# Patient Record
Sex: MALE | Race: WHITE | NOT HISPANIC OR LATINO | Employment: OTHER | URBAN - METROPOLITAN AREA
[De-identification: names, ages, dates, MRNs, and addresses within clinical notes are randomized per-mention and may not be internally consistent; named-entity substitution may affect disease eponyms.]

---

## 2023-05-31 ENCOUNTER — HOSPITAL ENCOUNTER (EMERGENCY)
Facility: HOSPITAL | Age: 83
Discharge: HOME | End: 2023-05-31
Attending: EMERGENCY MEDICINE
Payer: MEDICARE

## 2023-05-31 VITALS
SYSTOLIC BLOOD PRESSURE: 130 MMHG | RESPIRATION RATE: 16 BRPM | OXYGEN SATURATION: 96 % | HEIGHT: 71 IN | DIASTOLIC BLOOD PRESSURE: 73 MMHG | HEART RATE: 85 BPM | BODY MASS INDEX: 21.64 KG/M2 | TEMPERATURE: 98.4 F | WEIGHT: 154.54 LBS

## 2023-05-31 DIAGNOSIS — R33.9 URINARY RETENTION: Primary | ICD-10-CM

## 2023-05-31 LAB
ALBUMIN SERPL-MCNC: 4.8 G/DL (ref 3.5–5.2)
ALBUMIN/GLOB SERPL: 1.7 G/DL
ALP SERPL-CCNC: 52 U/L (ref 39–117)
ALT SERPL W P-5'-P-CCNC: 36 U/L (ref 1–41)
ANION GAP SERPL CALCULATED.3IONS-SCNC: 10.9 MMOL/L (ref 5–15)
AST SERPL-CCNC: 23 U/L (ref 1–40)
BACTERIA UR QL AUTO: ABNORMAL /HPF
BASOPHILS # BLD AUTO: 0.03 10*3/MM3 (ref 0–0.2)
BASOPHILS NFR BLD AUTO: 0.4 % (ref 0–1.5)
BILIRUB SERPL-MCNC: 1.1 MG/DL (ref 0–1.2)
BILIRUB UR QL STRIP: ABNORMAL
BUN SERPL-MCNC: 22 MG/DL (ref 8–23)
BUN/CREAT SERPL: 18 (ref 7–25)
CALCIUM SPEC-SCNC: 10 MG/DL (ref 8.6–10.5)
CHLORIDE SERPL-SCNC: 98 MMOL/L (ref 98–107)
CLARITY UR: CLEAR
CO2 SERPL-SCNC: 27.1 MMOL/L (ref 22–29)
COLOR UR: ABNORMAL
CREAT SERPL-MCNC: 1.22 MG/DL (ref 0.76–1.27)
D-LACTATE SERPL-SCNC: 2 MMOL/L (ref 0.5–2)
DEPRECATED RDW RBC AUTO: 42 FL (ref 37–54)
EGFRCR SERPLBLD CKD-EPI 2021: 58.8 ML/MIN/1.73
EOSINOPHIL # BLD AUTO: 0.01 10*3/MM3 (ref 0–0.4)
EOSINOPHIL NFR BLD AUTO: 0.1 % (ref 0.3–6.2)
ERYTHROCYTE [DISTWIDTH] IN BLOOD BY AUTOMATED COUNT: 12.1 % (ref 12.3–15.4)
GLOBULIN UR ELPH-MCNC: 2.8 GM/DL
GLUCOSE SERPL-MCNC: 138 MG/DL (ref 65–99)
GLUCOSE UR STRIP-MCNC: ABNORMAL MG/DL
HCT VFR BLD AUTO: 49 % (ref 37.5–51)
HGB BLD-MCNC: 16.7 G/DL (ref 13–17.7)
HGB UR QL STRIP.AUTO: ABNORMAL
HOLD SPECIMEN: NORMAL
HOLD SPECIMEN: NORMAL
HYALINE CASTS UR QL AUTO: ABNORMAL /LPF
IMM GRANULOCYTES # BLD AUTO: 0.01 10*3/MM3 (ref 0–0.05)
IMM GRANULOCYTES NFR BLD AUTO: 0.1 % (ref 0–0.5)
KETONES UR QL STRIP: ABNORMAL
LEUKOCYTE ESTERASE UR QL STRIP.AUTO: ABNORMAL
LIPASE SERPL-CCNC: 41 U/L (ref 13–60)
LYMPHOCYTES # BLD AUTO: 1.06 10*3/MM3 (ref 0.7–3.1)
LYMPHOCYTES NFR BLD AUTO: 13.5 % (ref 19.6–45.3)
MCH RBC QN AUTO: 32.3 PG (ref 26.6–33)
MCHC RBC AUTO-ENTMCNC: 34.1 G/DL (ref 31.5–35.7)
MCV RBC AUTO: 94.8 FL (ref 79–97)
MONOCYTES # BLD AUTO: 0.41 10*3/MM3 (ref 0.1–0.9)
MONOCYTES NFR BLD AUTO: 5.2 % (ref 5–12)
NEUTROPHILS NFR BLD AUTO: 6.31 10*3/MM3 (ref 1.7–7)
NEUTROPHILS NFR BLD AUTO: 80.7 % (ref 42.7–76)
NITRITE UR QL STRIP: ABNORMAL
NRBC BLD AUTO-RTO: 0 /100 WBC (ref 0–0.2)
PH UR STRIP.AUTO: ABNORMAL [PH]
PLATELET # BLD AUTO: 220 10*3/MM3 (ref 140–450)
PMV BLD AUTO: 9.5 FL (ref 6–12)
POTASSIUM SERPL-SCNC: 4.6 MMOL/L (ref 3.5–5.2)
PROT SERPL-MCNC: 7.6 G/DL (ref 6–8.5)
PROT UR QL STRIP: ABNORMAL
RBC # BLD AUTO: 5.17 10*6/MM3 (ref 4.14–5.8)
RBC # UR STRIP: ABNORMAL /HPF
REF LAB TEST METHOD: ABNORMAL
SODIUM SERPL-SCNC: 136 MMOL/L (ref 136–145)
SP GR UR STRIP: 1.01 (ref 1–1.03)
SQUAMOUS #/AREA URNS HPF: ABNORMAL /HPF
UROBILINOGEN UR QL STRIP: ABNORMAL
WBC # UR STRIP: ABNORMAL /HPF
WBC NRBC COR # BLD: 7.83 10*3/MM3 (ref 3.4–10.8)
WHOLE BLOOD HOLD COAG: NORMAL
WHOLE BLOOD HOLD SPECIMEN: NORMAL

## 2023-05-31 PROCEDURE — 99283 EMERGENCY DEPT VISIT LOW MDM: CPT

## 2023-05-31 PROCEDURE — 36415 COLL VENOUS BLD VENIPUNCTURE: CPT

## 2023-05-31 PROCEDURE — 83690 ASSAY OF LIPASE: CPT

## 2023-05-31 PROCEDURE — 85025 COMPLETE CBC W/AUTO DIFF WBC: CPT

## 2023-05-31 PROCEDURE — 80053 COMPREHEN METABOLIC PANEL: CPT

## 2023-05-31 PROCEDURE — 83605 ASSAY OF LACTIC ACID: CPT

## 2023-05-31 PROCEDURE — 81001 URINALYSIS AUTO W/SCOPE: CPT | Performed by: EMERGENCY MEDICINE

## 2023-05-31 RX ORDER — SODIUM CHLORIDE 0.9 % (FLUSH) 0.9 %
10 SYRINGE (ML) INJECTION AS NEEDED
Status: DISCONTINUED | OUTPATIENT
Start: 2023-05-31 | End: 2023-05-31 | Stop reason: HOSPADM

## 2023-05-31 RX ORDER — NITROFURANTOIN 25; 75 MG/1; MG/1
100 CAPSULE ORAL 2 TIMES DAILY
Qty: 14 CAPSULE | Refills: 0 | Status: SHIPPED | OUTPATIENT
Start: 2023-05-31 | End: 2023-06-09

## 2023-05-31 RX ORDER — ROSUVASTATIN CALCIUM 20 MG/1
20 TABLET, COATED ORAL DAILY
COMMUNITY

## 2023-05-31 RX ORDER — CLOPIDOGREL BISULFATE 75 MG/1
TABLET ORAL DAILY
COMMUNITY

## 2023-05-31 RX ORDER — LEVOTHYROXINE SODIUM 0.1 MG/1
100 TABLET ORAL DAILY
COMMUNITY

## 2023-05-31 RX ORDER — PANTOPRAZOLE SODIUM 40 MG/1
40 TABLET, DELAYED RELEASE ORAL DAILY
COMMUNITY

## 2023-05-31 NOTE — ED PROVIDER NOTES
Time: 8:48 AM EDT  Date of encounter:  5/31/2023  Independent Historian/Clinical History and Information was obtained by:   Patient  Chief Complaint: Urinary retention    History is limited by: N/A    History of Present Illness:  Patient is a 82 y.o. year old male who presents to the emergency department for evaluation of urinary retention.  Patient has not been sick in any way in the last 2 days and specifically denies dysuria, fever, abdominal pain.  He last urinated last night normally but overnight has since been unable to produce urine.  He comes to the ER today with suprapubic tenderness/fullness and a sense that his bladder was distended.  He denies any nausea vomiting diarrhea or constipation.  Since having the catheter placed on his arrival his symptoms have resolved and he feels normal on my physical exam.    HPI    Patient Care Team  Primary Care Provider: Juan Pablo Mar MD    Past Medical History:     No Known Allergies  Past Medical History:   Diagnosis Date   • Disease of thyroid gland    • Enlarged prostate    • Hyperlipidemia    • Kidney stone    • Stroke      Past Surgical History:   Procedure Laterality Date   • HERNIA REPAIR     • WRIST SURGERY       History reviewed. No pertinent family history.    Home Medications:  Prior to Admission medications    Medication Sig Start Date End Date Taking? Authorizing Provider   clopidogrel (PLAVIX) 75 MG tablet Take  by mouth Daily.    Berta Chew MD   levothyroxine (SYNTHROID, LEVOTHROID) 100 MCG tablet Take 1 tablet by mouth Daily.    Berta Chew MD   pantoprazole (PROTONIX) 40 MG EC tablet Take 1 tablet by mouth Daily.    Berta Chew MD   rosuvastatin (CRESTOR) 20 MG tablet Take 1 tablet by mouth Daily.    Berta Chew MD        Social History:   Social History     Tobacco Use   • Smoking status: Never   • Smokeless tobacco: Never   Vaping Use   • Vaping Use: Never used   Substance Use Topics   • Alcohol use: Yes   •  "Drug use: Never         Review of Systems:  Review of Systems   Constitutional: Negative for chills and fever.   HENT: Negative for congestion, rhinorrhea and sore throat.    Eyes: Negative for pain and visual disturbance.   Respiratory: Negative for apnea, cough, chest tightness and shortness of breath.    Cardiovascular: Negative for chest pain and palpitations.   Gastrointestinal: Negative for abdominal pain, diarrhea, nausea and vomiting.   Genitourinary: Positive for decreased urine volume and difficulty urinating. Negative for dysuria, flank pain, frequency, hematuria and testicular pain.   Musculoskeletal: Negative for joint swelling and myalgias.   Skin: Negative for color change.   Neurological: Negative for seizures and headaches.   Psychiatric/Behavioral: Negative.    All other systems reviewed and are negative.       Physical Exam:  /73   Pulse 85   Temp 98.4 °F (36.9 °C) (Oral)   Resp 16   Ht 180.3 cm (71\")   Wt 70.1 kg (154 lb 8.7 oz)   SpO2 96%   BMI 21.55 kg/m²     Physical Exam  Vitals and nursing note reviewed.   Constitutional:       Appearance: Normal appearance.   HENT:      Head: Normocephalic and atraumatic.      Nose: Nose normal.      Mouth/Throat:      Mouth: Mucous membranes are dry.   Eyes:      Extraocular Movements: Extraocular movements intact.      Pupils: Pupils are equal, round, and reactive to light.   Cardiovascular:      Rate and Rhythm: Normal rate and regular rhythm.      Heart sounds: Normal heart sounds.   Pulmonary:      Effort: Pulmonary effort is normal.      Breath sounds: Normal breath sounds.   Abdominal:      General: Bowel sounds are normal.      Palpations: Abdomen is soft.      Tenderness: There is no abdominal tenderness.   Musculoskeletal:         General: No swelling. Normal range of motion.      Cervical back: Normal range of motion and neck supple.   Skin:     General: Skin is warm and dry.      Coloration: Skin is not jaundiced.   Neurological:    "   General: No focal deficit present.      Mental Status: He is alert and oriented to person, place, and time. Mental status is at baseline.   Psychiatric:         Mood and Affect: Mood normal.         Behavior: Behavior normal.         Judgment: Judgment normal.                  Procedures:  Procedures      Medical Decision Making:      Comorbidities that affect care:    BPH, CVA, hyperlipidemia, thyroid disease    External Notes reviewed:    Encounter review: 12/12/2022 visit with internal medicine Dr. Mancilla. Description: Elevated PSA levels, BPH, GERD      The following orders were placed and all results were independently analyzed by me:  Orders Placed This Encounter   Procedures   • [place] Draw   • Comprehensive Metabolic Panel   • Lipase   • Lactic Acid, Plasma   • CBC Auto Differential   • Urinalysis With Culture If Indicated - Urine, Clean Catch   • Urinalysis, Microscopic Only - Urine, Clean Catch   • NPO Diet NPO Type: Strict NPO   • Undress & Gown   • Insert Peripheral IV   • CBC & Differential   • Green Top (Gel)   • Lavender Top   • Gold Top - SST   • Light Blue Top       Medications Given in the Emergency Department:  Medications   sodium chloride 0.9 % flush 10 mL (has no administration in time range)        ED Course:         Labs:    Lab Results (last 24 hours)     Procedure Component Value Units Date/Time    CBC & Differential [[id number]]  (Abnormal) Collected: 05/31/23 0633    Specimen: Blood Updated: 05/31/23 0642    Narrative:      The following orders were created for panel order CBC & Differential.  Procedure                               Abnormality         Status                     ---------                               -----------         ------                     CBC Auto Differential[[id number]]        Abnormal            Final result                 Please view results for these tests on the individual orders.    Comprehensive Metabolic Panel [[id number]]  (Abnormal) Collected:  05/31/23 0633    Specimen: Blood Updated: 05/31/23 0700     Glucose 138 mg/dL      BUN 22 mg/dL      Creatinine 1.22 mg/dL      Sodium 136 mmol/L      Potassium 4.6 mmol/L      Chloride 98 mmol/L      CO2 27.1 mmol/L      Calcium 10.0 mg/dL      Total Protein 7.6 g/dL      Albumin 4.8 g/dL      ALT (SGPT) 36 U/L      AST (SGOT) 23 U/L      Alkaline Phosphatase 52 U/L      Total Bilirubin 1.1 mg/dL      Globulin 2.8 gm/dL      A/G Ratio 1.7 g/dL      BUN/Creatinine Ratio 18.0     Anion Gap 10.9 mmol/L      eGFR 58.8 mL/min/1.73     Narrative:      GFR Normal >60  Chronic Kidney Disease <60  Kidney Failure <15    The GFR formula is only valid for adults with stable renal function between ages 18 and 70.    Lipase [[id number]]  (Normal) Collected: 05/31/23 0633    Specimen: Blood Updated: 05/31/23 0700     Lipase 41 U/L     Lactic Acid, Plasma [[id number]]  (Normal) Collected: 05/31/23 0633    Specimen: Blood Updated: 05/31/23 0658     Lactate 2.0 mmol/L     CBC Auto Differential [[id number]]  (Abnormal) Collected: 05/31/23 0633    Specimen: Blood Updated: 05/31/23 0642     WBC 7.83 10*3/mm3      RBC 5.17 10*6/mm3      Hemoglobin 16.7 g/dL      Hematocrit 49.0 %      MCV 94.8 fL      MCH 32.3 pg      MCHC 34.1 g/dL      RDW 12.1 %      RDW-SD 42.0 fl      MPV 9.5 fL      Platelets 220 10*3/mm3      Neutrophil % 80.7 %      Lymphocyte % 13.5 %      Monocyte % 5.2 %      Eosinophil % 0.1 %      Basophil % 0.4 %      Immature Grans % 0.1 %      Neutrophils, Absolute 6.31 10*3/mm3      Lymphocytes, Absolute 1.06 10*3/mm3      Monocytes, Absolute 0.41 10*3/mm3      Eosinophils, Absolute 0.01 10*3/mm3      Basophils, Absolute 0.03 10*3/mm3      Immature Grans, Absolute 0.01 10*3/mm3      nRBC 0.0 /100 WBC     Urinalysis With Culture If Indicated - Urine, Clean Catch [[id number]]  (Abnormal) Collected: 05/31/23 0832    Specimen: Urine, Clean Catch Updated: 05/31/23 0900     Color, UA Ritchie     Appearance, UA Clear     pH, UA --      Comment: Result not available due to interfering substances.        Specific Gravity, UA 1.010     Glucose, UA --     Comment: Result not available due to interfering substances.        Ketones, UA --     Comment: Result not available due to interfering substances.        Bilirubin, UA --     Comment: Result not available due to interfering substances.        Blood, UA --     Comment: Result not available due to interfering substances.        Protein, UA --     Comment: Result not available due to interfering substances.        Leuk Esterase, UA --     Comment: Result not available due to interfering substances.        Nitrite, UA --     Comment: Result not available due to interfering substances.        Urobilinogen, UA --     Comment: Result not available due to interfering substances.       Narrative:      In absence of clinical symptoms, the presence of pyuria, bacteria, and/or nitrites on the urinalysis result does not correlate with infection.    Urinalysis, Microscopic Only - Urine, Clean Catch [[id number]]  (Abnormal) Collected: 05/31/23 0832    Specimen: Urine, Clean Catch Updated: 05/31/23 0900     RBC, UA Too Numerous to Count /HPF      WBC, UA 0-2 /HPF      Comment: Urine culture not indicated.        Bacteria, UA None Seen /HPF      Squamous Epithelial Cells, UA 0-2 /HPF      Hyaline Casts, UA None Seen /LPF      Methodology Automated Microscopy           Imaging:    No Radiology Exams Resulted Within Past 24 Hours      Differential Diagnosis and Discussion:    Abdominal Pain: Based on the patient's signs and symptoms, I considered abdominal aortic aneurysm, small bowel obstruction, pancreatitis, acute cholecystitis, acute appendecitis, peptic ulcer disease, gastritis, colitis, endocrine disorders, irritable bowel syndrome and other differential diagnosis an etiology of the patient's abdominal pain.    All labs were reviewed and interpreted by me.    MDM         Patient Care Considerations:    CT  ABDOMEN AND PELVIS: I considered ordering a CT scan of the abdomen and pelvis however Patient has 0 abdominal pain on my exam.      Consultants/Shared Management Plan:    None    Social Determinants of Health:    Patient is independent, reliable, and has access to care.       Disposition and Care Coordination:    Discharged: The patient is suitable and stable for discharge with no need for consideration of observation or admission.    I have explained the patient´s condition, diagnoses and treatment plan based on the information available to me at this time. I have answered questions and addressed any concerns. The patient has a good  understanding of the patient´s diagnosis, condition, and treatment plan as can be expected at this point. The vital signs have been stable. The patient´s condition is stable and appropriate for discharge from the emergency department.      The patient will pursue further outpatient evaluation with the primary care physician or other designated or consulting physician as outlined in the discharge instructions. They are agreeable to this plan of care and follow-up instructions have been explained in detail. The patient has received these instructions in written format and have expressed an understanding of the discharge instructions. The patient is aware that any significant change in condition or worsening of symptoms should prompt an immediate return to this or the closest emergency department or call to 911.    Final diagnoses:   Urinary retention        ED Disposition     ED Disposition   Discharge    Condition   Stable    Comment   --             This medical record created using voice recognition software.           Raj Lovell MD  05/31/23 9805

## 2023-05-31 NOTE — DISCHARGE INSTRUCTIONS
Return to the ER for fever, persistent abdominal pain.  Call today to schedule an appointment with urology in the next week to have your catheter removed.  Stay well-hydrated.

## 2023-05-31 NOTE — ED TRIAGE NOTES
"Patient arrived to ED with complaints \"I can't pee.\"      States he has been up all night trying but unsuccessful.      \"I'm in a load of pain and feel like I am going to explode.\"     States the last time he urinated was last night around 2130.     Denies any fever, nausea or vomiting.   "

## 2023-06-08 NOTE — PROGRESS NOTES
Chief Complaint: Urinary Retention    Subjective         History of Present Illness  Shai Cole is a 82 y.o. male presents to [facility] to be seen for urinary retention.    Patient was seen in the ER on 5/31/2023 with urinary retention he did have a Valerio catheter placed which resolved his symptoms. He had over 700 ml in his bladder. He reports he had previously tried flomax but this caused dizziness. He reports he had hernia surgery in the past and was not able to void at that time he had a catheter for one day. He reports when the catheter was placed it was a difficult insertion but the relief was immediate.     Frequency- denies    Urgency- denies    Incontinence- denies    Nocturia- 2-3    Stream- hesitancy    GH- denies    Hx of kidney stones     surgeries- bladder stone retrieval    Family history of  malignancy- denies    Cardiopulmonary- denies    Anticoagulants- Plavix- CVA     Smoker- denies    Objective     Past Medical History:   Diagnosis Date    Disease of thyroid gland     Enlarged prostate     Hyperlipidemia     Kidney stone     Stroke        Past Surgical History:   Procedure Laterality Date    HERNIA REPAIR      WRIST SURGERY           Current Outpatient Medications:     levothyroxine (SYNTHROID, LEVOTHROID) 100 MCG tablet, Take 1 tablet by mouth Daily., Disp: , Rfl:     pantoprazole (PROTONIX) 40 MG EC tablet, Take 1 tablet by mouth Daily., Disp: , Rfl:     rosuvastatin (CRESTOR) 20 MG tablet, Take 1 tablet by mouth Daily., Disp: , Rfl:     clopidogrel (PLAVIX) 75 MG tablet, Take  by mouth Daily. (Patient not taking: Reported on 6/9/2023), Disp: , Rfl:     nitrofurantoin, macrocrystal-monohydrate, (MACROBID) 100 MG capsule, Take 1 capsule by mouth 2 (Two) Times a Day., Disp: 14 capsule, Rfl: 0    terazosin (HYTRIN) 1 MG capsule, Take 1 capsule by mouth Every Night., Disp: 30 capsule, Rfl: 3    No Known Allergies     No family history on file.    Social History  "    Socioeconomic History    Marital status:    Tobacco Use    Smoking status: Never     Passive exposure: Never    Smokeless tobacco: Never   Vaping Use    Vaping Use: Never used   Substance and Sexual Activity    Alcohol use: Yes    Drug use: Never    Sexual activity: Defer       Vital Signs:   Resp 16   Ht 180.3 cm (71\")   Wt 68.4 kg (150 lb 12.8 oz)   BMI 21.03 kg/m²      Physical Exam  Vitals reviewed.   Constitutional:       Appearance: Normal appearance.   Neurological:      General: No focal deficit present.      Mental Status: He is alert and oriented to person, place, and time.   Psychiatric:         Mood and Affect: Mood normal.         Behavior: Behavior normal.        Result Review :   The following data was reviewed by: YANI Maldonado on 06/09/2023:           Procedures        Assessment and Plan    Diagnoses and all orders for this visit:    1. Urinary retention (Primary)  -     Cancel: Bladder Scan  -     terazosin (HYTRIN) 1 MG capsule; Take 1 capsule by mouth Every Night.  Dispense: 30 capsule; Refill: 3    2. Benign prostatic hyperplasia with urinary retention  -     terazosin (HYTRIN) 1 MG capsule; Take 1 capsule by mouth Every Night.  Dispense: 30 capsule; Refill: 3    Discussed multiple options for treatment with his urinary retention.  He is not able to tolerate Flomax due to dizziness, so we will go with terazosin and we are starting at the lowest dose to hopefully prevent any dizziness that he may develop.  He will take this at bedtime.  We did discuss the possibility of doing a void trial today, but with it being Friday we were concerned that he would end up back in the emergency department over the weekend.  He will come back in on Tuesday at 8 AM for a void trial.  I did advise him that we would be closely monitoring for urinary retention so that he does not end up with an overstretched bladder.  He verbalized understanding.      Follow Up   No follow-ups on " file.  Patient was given instructions and counseling regarding his condition or for health maintenance advice. Please see specific information pulled into the AVS if appropriate.         This document has been electronically signed by YANI Maldonado  June 9, 2023 09:51 EDT

## 2023-06-09 ENCOUNTER — OFFICE VISIT (OUTPATIENT)
Dept: UROLOGY | Facility: CLINIC | Age: 83
End: 2023-06-09
Payer: MEDICARE

## 2023-06-09 VITALS — RESPIRATION RATE: 16 BRPM | BODY MASS INDEX: 21.11 KG/M2 | WEIGHT: 150.8 LBS | HEIGHT: 71 IN

## 2023-06-09 DIAGNOSIS — R33.8 BENIGN PROSTATIC HYPERPLASIA WITH URINARY RETENTION: ICD-10-CM

## 2023-06-09 DIAGNOSIS — R33.9 URINARY RETENTION: Primary | ICD-10-CM

## 2023-06-09 DIAGNOSIS — N40.1 BENIGN PROSTATIC HYPERPLASIA WITH URINARY RETENTION: ICD-10-CM

## 2023-06-09 RX ORDER — TERAZOSIN 1 MG/1
1 CAPSULE ORAL NIGHTLY
Qty: 30 CAPSULE | Refills: 3 | Status: SHIPPED | OUTPATIENT
Start: 2023-06-09

## 2023-06-13 ENCOUNTER — OFFICE VISIT (OUTPATIENT)
Dept: UROLOGY | Facility: CLINIC | Age: 83
End: 2023-06-13
Payer: MEDICARE

## 2023-06-13 ENCOUNTER — TELEPHONE (OUTPATIENT)
Dept: UROLOGY | Facility: CLINIC | Age: 83
End: 2023-06-13

## 2023-06-13 VITALS — RESPIRATION RATE: 16 BRPM | WEIGHT: 150.79 LBS | HEIGHT: 71 IN | BODY MASS INDEX: 21.11 KG/M2

## 2023-06-13 DIAGNOSIS — N40.1 BENIGN PROSTATIC HYPERPLASIA WITH URINARY RETENTION: ICD-10-CM

## 2023-06-13 DIAGNOSIS — R33.9 URINARY RETENTION: Primary | ICD-10-CM

## 2023-06-13 DIAGNOSIS — R33.8 BENIGN PROSTATIC HYPERPLASIA WITH URINARY RETENTION: ICD-10-CM

## 2023-06-13 NOTE — PROGRESS NOTES
"Chief Complaint  void trial    Subjective        Shai Cole presents to [facility] UROLOG[facility]  History of Present Illness    Objective   Vital Signs:  Resp 16   Ht 180.3 cm (71\")   Wt 68.4 kg (150 lb 12.7 oz)   BMI 21.03 kg/m²   Estimated body mass index is 21.03 kg/m² as calculated from the following:    Height as of this encounter: 180.3 cm (71\").    Weight as of this encounter: 68.4 kg (150 lb 12.7 oz).       BMI is within normal parameters. No other follow-up for BMI required.      Physical Exam   Result Review :            Irrigation of Bladder    Date/Time: 6/13/2023 8:15 AM  Performed by: Awilda Caceres RN  Authorized by: Fide Tucker APRN   Preparation: Patient was prepped and draped in the usual sterile fashion.  Local anesthesia used: no    Anesthesia:  Local anesthesia used: no    Sedation:  Patient sedated: no    Patient tolerance: patient tolerated the procedure well with no immediate complications  Comments: Patient in supine position.  Detached urinary drain bag from catheter.  Instilled 100Patient in supine position.  Detached urinary drain bag from catheter.  Instilled 100ml of sterile water into the bladder.  Deflated balloon and removed catheter.  Patient tolerated well.  Advised patient that if could not urinate within 4-6 hours or if pain becomes unbearable to come back into the office today to have a catheter inserted.  Patient voiced understanding.ml of sterile water into the bladder.  Deflated balloon and removed catheter.  Patient tolerated well.  Advised patient that if could not urinate within 4-6 hours or if pain becomes unbearable to come back into the office today to have a catheter inserted.  Patient voiced understanding.          Assessment and Plan   There are no diagnoses linked to this encounter.         Follow Up   No follow-ups on file.  Patient was given instructions and counseling regarding his condition or for health maintenance advice. Please see " specific information pulled into the AVS if appropriate.

## 2023-06-13 NOTE — TELEPHONE ENCOUNTER
Pt called back and said that he urinated a lot and had some bloody leakage along with blood in his urine while urinating.

## 2023-06-14 ENCOUNTER — CLINICAL SUPPORT (OUTPATIENT)
Dept: UROLOGY | Facility: CLINIC | Age: 83
End: 2023-06-14
Payer: MEDICARE

## 2023-06-14 VITALS — WEIGHT: 150 LBS | HEIGHT: 71 IN | BODY MASS INDEX: 21 KG/M2

## 2023-06-14 DIAGNOSIS — R33.9 URINARY RETENTION: Primary | ICD-10-CM

## 2023-06-14 LAB — URINE VOLUME: 41

## 2023-06-14 NOTE — PROGRESS NOTES
"Chief Complaint  Urinary Retention    Subjective        Shai Cole presents to [facility]  Urinary Retention      Objective   Vital Signs:  Ht 180.3 cm (70.98\")   Wt 68 kg (150 lb)   BMI 20.93 kg/m²   Estimated body mass index is 20.93 kg/m² as calculated from the following:    Height as of this encounter: 180.3 cm (70.98\").    Weight as of this encounter: 68 kg (150 lb).       BMI is within normal parameters. No other follow-up for BMI required.      Physical Exam   Result Review :                   Assessment and Plan   Diagnoses and all orders for this visit:    1. Urinary retention (Primary)  -     Bladder Scan      Bladder scanned patient and was found to have 41ml left in bladder after emptying his bladder right before. Patient states that he has been going well on his own. Per Antonette Tucker she wants patient to come back in a week to have his bladder scanned again. Encouraged patient to call us if any concerns or questions come up, and patient verbalized understanding.         Follow Up   No follow-ups on file.  Patient was given instructions and counseling regarding his condition or for health maintenance advice. Please see specific information pulled into the AVS if appropriate.       Answers submitted by the patient for this visit:  Nurse Visit on 6/14/2023  2:00 PM with NURSE  Other (Submitted on 6/13/2023)  Please describe your symptoms.: Follow up bladder scan.  Have you had these symptoms before?: No  How long have you been having these symptoms?: 5-7 days    "

## 2023-06-19 ENCOUNTER — OFFICE VISIT (OUTPATIENT)
Dept: INTERNAL MEDICINE | Facility: CLINIC | Age: 83
End: 2023-06-19
Payer: MEDICARE

## 2023-06-19 VITALS
HEIGHT: 71 IN | DIASTOLIC BLOOD PRESSURE: 76 MMHG | BODY MASS INDEX: 21.17 KG/M2 | HEART RATE: 75 BPM | TEMPERATURE: 97.1 F | OXYGEN SATURATION: 97 % | WEIGHT: 151.2 LBS | SYSTOLIC BLOOD PRESSURE: 138 MMHG

## 2023-06-19 DIAGNOSIS — E89.0 POSTABLATIVE HYPOTHYROIDISM: ICD-10-CM

## 2023-06-19 DIAGNOSIS — G45.9 TIA DUE TO EMBOLISM: ICD-10-CM

## 2023-06-19 DIAGNOSIS — R73.9 HYPERGLYCEMIA: ICD-10-CM

## 2023-06-19 DIAGNOSIS — N40.1 BENIGN PROSTATIC HYPERPLASIA WITH URINARY RETENTION: ICD-10-CM

## 2023-06-19 DIAGNOSIS — Z00.00 WELL ADULT EXAM: ICD-10-CM

## 2023-06-19 DIAGNOSIS — Z12.5 PROSTATE CANCER SCREENING: ICD-10-CM

## 2023-06-19 DIAGNOSIS — E53.8 VITAMIN B12 DEFICIENCY: ICD-10-CM

## 2023-06-19 DIAGNOSIS — E78.2 MIXED HYPERLIPIDEMIA: Primary | ICD-10-CM

## 2023-06-19 DIAGNOSIS — R33.8 BENIGN PROSTATIC HYPERPLASIA WITH URINARY RETENTION: ICD-10-CM

## 2023-06-19 DIAGNOSIS — M85.89 OSTEOPENIA OF MULTIPLE SITES: ICD-10-CM

## 2023-06-19 DIAGNOSIS — K21.9 GASTROESOPHAGEAL REFLUX DISEASE WITHOUT ESOPHAGITIS: ICD-10-CM

## 2023-06-19 DIAGNOSIS — I74.9 TIA DUE TO EMBOLISM: ICD-10-CM

## 2023-06-19 DIAGNOSIS — E55.9 VITAMIN D DEFICIENCY: ICD-10-CM

## 2023-06-19 PROBLEM — M46.1 INFLAMMATION OF SACROILIAC JOINT: Status: ACTIVE | Noted: 2020-12-01

## 2023-06-19 PROBLEM — E03.4 HYPOTHYROIDISM DUE TO ACQUIRED ATROPHY OF THYROID: Status: ACTIVE | Noted: 2023-06-19

## 2023-06-19 PROBLEM — D33.3 ACOUSTIC NEUROMA: Status: ACTIVE | Noted: 2019-12-02

## 2023-06-19 PROBLEM — I10 PRIMARY HYPERTENSION: Status: ACTIVE | Noted: 2023-06-19

## 2023-06-19 PROBLEM — J84.9 INTERSTITIAL LUNG DISEASE: Status: ACTIVE | Noted: 2019-12-03

## 2023-06-19 PROCEDURE — 1159F MED LIST DOCD IN RCRD: CPT | Performed by: INTERNAL MEDICINE

## 2023-06-19 PROCEDURE — 1160F RVW MEDS BY RX/DR IN RCRD: CPT | Performed by: INTERNAL MEDICINE

## 2023-06-19 PROCEDURE — 99204 OFFICE O/P NEW MOD 45 MIN: CPT | Performed by: INTERNAL MEDICINE

## 2023-06-19 RX ORDER — LANOLIN ALCOHOL/MO/W.PET/CERES
1000 CREAM (GRAM) TOPICAL WEEKLY
COMMUNITY

## 2023-06-19 RX ORDER — CHLORAL HYDRATE 500 MG
CAPSULE ORAL
COMMUNITY

## 2023-06-19 NOTE — ASSESSMENT & PLAN NOTE
This is an ongoing issue as of his 6/23 appointment.  He had problems with this in the past, was tried on tamsulosin, had some dizziness.  He was recently seen in the ER for significant urine retention, no UTI, no over-the-counter sinus meds etc.  He required a Valerio for over a week, has since seen urology and been started on very low-dose Hytrin.  He has follow-up scheduled with them later this week, appreciate their expertise.

## 2023-06-19 NOTE — ASSESSMENT & PLAN NOTE
Patient maintained on typical dose Plavix only as of 6/23, certainly recommend continue same along with aggressive LDL lowering.

## 2023-06-19 NOTE — PROGRESS NOTES
"Chief Complaint  Getting Established (Pt recently moved from [facility]. He usually goes to the doctor every 6 months. /Recent seen urology for non urination, he was put on Terazosin, he has a follow up on Wednesday. )    Subjective      Shai Cole presents to [facility]    History of Present Illness  Patient very pleasant 82-year-old male with underlying hyperlipidemia, hypothyroidism, prior TIA, currently being evaluated for BPH by urology, who is being seen 6/23 as a New Patient.  We will go over his med list, review recent labs, and make further recommendations at that time.    Review of Systems   Constitutional:  Negative for appetite change, fatigue and fever.   HENT:  Negative for congestion and ear pain.    Eyes:  Negative for blurred vision.   Respiratory:  Negative for cough, chest tightness, shortness of breath and wheezing.    Cardiovascular:  Negative for chest pain, palpitations and leg swelling.   Gastrointestinal:  Negative for abdominal pain.   Genitourinary:  Negative for difficulty urinating, dysuria and hematuria.   Musculoskeletal:  Negative for arthralgias and gait problem.   Skin:  Negative for skin lesions.   Neurological:  Negative for syncope, memory problem and confusion.   Psychiatric/Behavioral:  Negative for self-injury and depressed mood.      Objective   Vital Signs:   /76   Pulse 75   Temp 97.1 °F (36.2 °C) (Skin)   Ht 180.3 cm (70.98\")   Wt 68.6 kg (151 lb 3.2 oz)   SpO2 97%   BMI 21.10 kg/m²           Physical Exam  Vitals and nursing note reviewed.   Constitutional:       General: He is not in acute distress.     Appearance: Normal appearance. He is not toxic-appearing.   HENT:      Head: Atraumatic.      Right Ear: External ear normal.      Left Ear: External ear normal.      Nose: Nose normal.      Mouth/Throat:      Mouth: Mucous membranes are moist.   Eyes:      General:         Right eye: No discharge.         Left eye: No " discharge.      Extraocular Movements: Extraocular movements intact.      Pupils: Pupils are equal, round, and reactive to light.   Neck:      Comments: No carotid bruit.  Cardiovascular:      Rate and Rhythm: Normal rate and regular rhythm.      Pulses: Normal pulses.      Heart sounds: Normal heart sounds. No murmur heard.    No gallop.      Comments: Heart tones normal, no ectopy, no S3.  Pulmonary:      Effort: Pulmonary effort is normal. No respiratory distress.      Breath sounds: No wheezing, rhonchi or rales.      Comments: Lung fields clear bilaterally.  Abdominal:      General: There is no distension.      Palpations: Abdomen is soft. There is no mass.      Tenderness: There is no abdominal tenderness. There is no guarding.   Musculoskeletal:         General: No swelling or tenderness.      Cervical back: No tenderness.      Right lower leg: No edema.      Left lower leg: No edema.      Comments: No edema.   Skin:     General: Skin is warm and dry.      Findings: No rash.   Neurological:      General: No focal deficit present.      Mental Status: He is alert and oriented to person, place, and time. Mental status is at baseline.      Motor: No weakness.      Gait: Gait normal.   Psychiatric:         Mood and Affect: Mood normal.         Thought Content: Thought content normal.        Result Review   The following data was reviewed by: Juan Pablo Mar MD on 06/19/2023:  [] Laboratory  [] Microbiology  [] Radiology  [] EKG/telemetry  [] Cardiology/Vascular  [] Pathology  [] Old records             Assessment and Plan   Diagnoses and all orders for this visit:    1. Mixed hyperlipidemia (Primary)  Assessment & Plan:  Patient been maintained on moderate dose Crestor for a while now as of his 6/23 office visit.  We will continue same and get fasting lipids on return to office.    Orders:  -     Comprehensive Metabolic Panel; Future  -     Lipid Panel; Future    2. Gastroesophageal reflux disease without  esophagitis    3. Benign prostatic hyperplasia with urinary retention  Assessment & Plan:  This is an ongoing issue as of his 6/23 appointment.  He had problems with this in the past, was tried on tamsulosin, had some dizziness.  He was recently seen in the ER for significant urine retention, no UTI, no over-the-counter sinus meds etc.  He required a Valerio for over a week, has since seen urology and been started on very low-dose Hytrin.  He has follow-up scheduled with them later this week, appreciate their expertise.      4. Well adult exam  Overview:  Preventive measures: were reviewed with the patient at this office visit.  They included but were not limited to discussions in regards to vaccines outstanding, auto safety with seat belts and other assistive devices, fall prevention, and routine screening studies.    Exercise: No ischemic symptoms with routine housework and golfing.  Comprehensive labs: Reviewed all from his ER visit 5/23, but lipids/etc. still needed as of 6/26.    Covid vaccine: Up-to-date as of/23.  Other vaccines: Patient will need Prevnar 20 likely on return to office.    PSA: Deferred to urology.  Colon: Patient aged out, no family history colon cancer/etc.    SH: , retired  but still works part-time remotely, no tobacco, 2 children, son in this area he just moved to be near to.  FH: Noncontributory.      5. Postablative hypothyroidism  Overview:  Patient was ablated around age 64 due to episode of hyperthyroidism.    Assessment & Plan:  Patient is clinically euthyroid, has been on the same dose for quite some time now, so we will continue 100 mcg daily and repeat labs on return to office.    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    Assessment & Plan:  Patient maintained on typical dose Plavix only as of 6/23, certainly recommend continue same along  with aggressive LDL lowering.      7. Vitamin B12 deficiency  -     Vitamin B12 anemia; Future  -     Folate anemia; Future    8. Osteopenia of multiple sites  -     DEXA Bone Density Axial    9. Vitamin D deficiency  -     Vitamin D,25-Hydroxy; Future    10. Hyperglycemia  -     Hemoglobin A1c; Future    11. Prostate cancer screening  -     PSA Screen; Future        BMI is within normal parameters. No other follow-up for BMI required.            Follow Up   Return in about 3 months (around 9/19/2023).  Patient was given instructions and counseling regarding his condition or for health maintenance advice. Please see specific information pulled into the AVS if appropriate.     Total Time Spent:   minutes     This time includes time spent by me in the following activities: preparing for the visit, reviewing extensive past medical history and tests, performing a medically appropriate examination and/or evaluation, counseling and educating the patient and/or caregivers, ordering medications, tests, or procedures, referring and/or communicating with other health care professionals and documenting information in the medical record all on this date of service.

## 2023-06-19 NOTE — ASSESSMENT & PLAN NOTE
Patient been maintained on moderate dose Crestor for a while now as of his 6/23 office visit.  We will continue same and get fasting lipids on return to office.

## 2023-06-19 NOTE — ASSESSMENT & PLAN NOTE
Patient is clinically euthyroid, has been on the same dose for quite some time now, so we will continue 100 mcg daily and repeat labs on return to office.

## 2023-08-18 NOTE — TELEPHONE ENCOUNTER
Rx Refill Note  Requested Prescriptions      No prescriptions requested or ordered in this encounter      Last office visit with prescribing clinician: 6/19/2023   Last telemedicine visit with prescribing clinician: Visit date not found   Next office visit with prescribing clinician: 9/19/2023                         Would you like a call back once the refill request has been completed: [] Yes [] No    If the office needs to give you a call back, can they leave a voicemail: [] Yes [] No    Tomeka Beltre MA  08/18/23, 12:55 EDT

## 2023-08-19 RX ORDER — ROSUVASTATIN CALCIUM 20 MG/1
20 TABLET, COATED ORAL DAILY
Qty: 90 TABLET | Refills: 1 | Status: SHIPPED | OUTPATIENT
Start: 2023-08-19

## 2023-08-19 RX ORDER — LEVOTHYROXINE SODIUM 0.1 MG/1
100 TABLET ORAL DAILY
Qty: 90 TABLET | Refills: 1 | Status: SHIPPED | OUTPATIENT
Start: 2023-08-19

## 2023-09-11 ENCOUNTER — TELEPHONE (OUTPATIENT)
Dept: INTERNAL MEDICINE | Facility: CLINIC | Age: 83
End: 2023-09-11
Payer: MEDICARE

## 2023-09-11 NOTE — TELEPHONE ENCOUNTER
Caller: Shai Cole    Relationship: Self    Best call back number: [phone]     What is the best time to reach you: ANYTIME     Who are you requesting to speak with (clinical staff, provider,  specific staff member): CLINICAL     What was the call regarding: PATIENT IS CALLING TO CONFIRM A ACTIVE LAB ORDER PRIOR TO APPOINTMENT ON 09/19/2023

## 2023-09-14 ENCOUNTER — LAB (OUTPATIENT)
Dept: INTERNAL MEDICINE | Facility: CLINIC | Age: 83
End: 2023-09-14
Payer: MEDICARE

## 2023-09-14 DIAGNOSIS — R73.9 HYPERGLYCEMIA: ICD-10-CM

## 2023-09-14 DIAGNOSIS — Z12.5 PROSTATE CANCER SCREENING: ICD-10-CM

## 2023-09-14 DIAGNOSIS — E55.9 VITAMIN D DEFICIENCY: ICD-10-CM

## 2023-09-14 DIAGNOSIS — E89.0 POSTABLATIVE HYPOTHYROIDISM: ICD-10-CM

## 2023-09-14 DIAGNOSIS — E78.2 MIXED HYPERLIPIDEMIA: ICD-10-CM

## 2023-09-14 DIAGNOSIS — E53.8 VITAMIN B12 DEFICIENCY: ICD-10-CM

## 2023-09-14 LAB
25(OH)D3 SERPL-MCNC: 54.1 NG/ML (ref 30–100)
ALBUMIN SERPL-MCNC: 4.7 G/DL (ref 3.5–5.2)
ALBUMIN/GLOB SERPL: 2.1 G/DL
ALP SERPL-CCNC: 46 U/L (ref 39–117)
ALT SERPL W P-5'-P-CCNC: 25 U/L (ref 1–41)
ANION GAP SERPL CALCULATED.3IONS-SCNC: 12.5 MMOL/L (ref 5–15)
AST SERPL-CCNC: 31 U/L (ref 1–40)
BILIRUB SERPL-MCNC: 1.5 MG/DL (ref 0–1.2)
BUN SERPL-MCNC: 16 MG/DL (ref 8–23)
BUN/CREAT SERPL: 13.3 (ref 7–25)
CALCIUM SPEC-SCNC: 10 MG/DL (ref 8.6–10.5)
CHLORIDE SERPL-SCNC: 102 MMOL/L (ref 98–107)
CHOLEST SERPL-MCNC: 179 MG/DL (ref 0–200)
CO2 SERPL-SCNC: 27.5 MMOL/L (ref 22–29)
CREAT SERPL-MCNC: 1.2 MG/DL (ref 0.76–1.27)
EGFRCR SERPLBLD CKD-EPI 2021: 59.6 ML/MIN/1.73
FOLATE SERPL-MCNC: 17.7 NG/ML (ref 4.78–24.2)
GLOBULIN UR ELPH-MCNC: 2.2 GM/DL
GLUCOSE SERPL-MCNC: 109 MG/DL (ref 65–99)
HBA1C MFR BLD: 5.6 % (ref 4.8–5.6)
HDLC SERPL-MCNC: 99 MG/DL (ref 40–60)
LDLC SERPL CALC-MCNC: 69 MG/DL (ref 0–100)
LDLC/HDLC SERPL: 0.69 {RATIO}
POTASSIUM SERPL-SCNC: 4.6 MMOL/L (ref 3.5–5.2)
PROT SERPL-MCNC: 6.9 G/DL (ref 6–8.5)
PSA SERPL-MCNC: 4.99 NG/ML (ref 0–4)
SODIUM SERPL-SCNC: 142 MMOL/L (ref 136–145)
TRIGL SERPL-MCNC: 59 MG/DL (ref 0–150)
TSH SERPL DL<=0.05 MIU/L-ACNC: 1.34 UIU/ML (ref 0.27–4.2)
VIT B12 BLD-MCNC: 788 PG/ML (ref 211–946)
VLDLC SERPL-MCNC: 11 MG/DL (ref 5–40)

## 2023-09-14 PROCEDURE — 36415 COLL VENOUS BLD VENIPUNCTURE: CPT | Performed by: INTERNAL MEDICINE

## 2023-09-14 PROCEDURE — 82607 VITAMIN B-12: CPT | Performed by: INTERNAL MEDICINE

## 2023-09-14 PROCEDURE — 82306 VITAMIN D 25 HYDROXY: CPT | Performed by: INTERNAL MEDICINE

## 2023-09-14 PROCEDURE — 80053 COMPREHEN METABOLIC PANEL: CPT | Performed by: INTERNAL MEDICINE

## 2023-09-14 PROCEDURE — 82746 ASSAY OF FOLIC ACID SERUM: CPT | Performed by: INTERNAL MEDICINE

## 2023-09-14 PROCEDURE — G0103 PSA SCREENING: HCPCS | Performed by: INTERNAL MEDICINE

## 2023-09-14 PROCEDURE — 83036 HEMOGLOBIN GLYCOSYLATED A1C: CPT | Performed by: INTERNAL MEDICINE

## 2023-09-14 PROCEDURE — 80061 LIPID PANEL: CPT | Performed by: INTERNAL MEDICINE

## 2023-09-14 PROCEDURE — 84443 ASSAY THYROID STIM HORMONE: CPT | Performed by: INTERNAL MEDICINE

## 2023-09-18 PROBLEM — M46.1 INFLAMMATION OF SACROILIAC JOINT: Status: RESOLVED | Noted: 2020-12-01 | Resolved: 2023-09-18

## 2023-09-18 PROBLEM — R97.20 HIGH PROSTATE SPECIFIC ANTIGEN (PSA): Status: ACTIVE | Noted: 2022-06-07

## 2023-09-19 ENCOUNTER — OFFICE VISIT (OUTPATIENT)
Dept: INTERNAL MEDICINE | Facility: CLINIC | Age: 83
End: 2023-09-19
Payer: MEDICARE

## 2023-09-19 VITALS
HEART RATE: 72 BPM | OXYGEN SATURATION: 97 % | SYSTOLIC BLOOD PRESSURE: 120 MMHG | TEMPERATURE: 97.7 F | HEIGHT: 71 IN | BODY MASS INDEX: 21.03 KG/M2 | WEIGHT: 150.2 LBS | DIASTOLIC BLOOD PRESSURE: 70 MMHG

## 2023-09-19 DIAGNOSIS — M81.0 AGE-RELATED OSTEOPOROSIS WITHOUT CURRENT PATHOLOGICAL FRACTURE: ICD-10-CM

## 2023-09-19 DIAGNOSIS — R97.20 HIGH PROSTATE SPECIFIC ANTIGEN (PSA): ICD-10-CM

## 2023-09-19 DIAGNOSIS — E55.9 VITAMIN D DEFICIENCY: ICD-10-CM

## 2023-09-19 DIAGNOSIS — E53.8 VITAMIN B12 DEFICIENCY: ICD-10-CM

## 2023-09-19 DIAGNOSIS — E89.0 POSTABLATIVE HYPOTHYROIDISM: ICD-10-CM

## 2023-09-19 DIAGNOSIS — R33.8 BENIGN PROSTATIC HYPERPLASIA WITH URINARY RETENTION: ICD-10-CM

## 2023-09-19 DIAGNOSIS — H61.23 BILATERAL IMPACTED CERUMEN: ICD-10-CM

## 2023-09-19 DIAGNOSIS — R73.01 IMPAIRED FASTING GLUCOSE: ICD-10-CM

## 2023-09-19 DIAGNOSIS — E78.2 MIXED HYPERLIPIDEMIA: Primary | ICD-10-CM

## 2023-09-19 DIAGNOSIS — R33.9 URINARY RETENTION: ICD-10-CM

## 2023-09-19 DIAGNOSIS — N40.1 BENIGN PROSTATIC HYPERPLASIA WITH URINARY RETENTION: ICD-10-CM

## 2023-09-19 DIAGNOSIS — G45.9 TIA DUE TO EMBOLISM: ICD-10-CM

## 2023-09-19 DIAGNOSIS — Z23 NEED FOR VACCINATION: ICD-10-CM

## 2023-09-19 DIAGNOSIS — Z00.00 WELL ADULT EXAM: ICD-10-CM

## 2023-09-19 DIAGNOSIS — I74.9 TIA DUE TO EMBOLISM: ICD-10-CM

## 2023-09-19 RX ORDER — CALCIUM CARBONATE 500 MG/1
1 TABLET, CHEWABLE ORAL DAILY
Qty: 90 TABLET | Refills: 1 | Status: SHIPPED | OUTPATIENT
Start: 2023-09-19 | End: 2023-09-19

## 2023-09-19 RX ORDER — TERAZOSIN 1 MG/1
1 CAPSULE ORAL NIGHTLY
Qty: 90 CAPSULE | Refills: 1 | Status: SHIPPED | OUTPATIENT
Start: 2023-09-19

## 2023-09-19 NOTE — ASSESSMENT & PLAN NOTE
Fasting blood sugar which is mild elevated at 109 as of his 9/23 office visit.  We did check an A1c, it was normal at 5.6.  Patient reports this is the first time the fasting sugar was up, so we will repeat both these labs in about 6 months, hopefully a nonissue going forward as well.

## 2023-09-19 NOTE — ASSESSMENT & PLAN NOTE
As noted above underneath the BPH heading, patient's had a history of this previously.  PSA has been as high as 6.7 it sounds like, currently at 4.9.  We will get free and total on return to office, but more than likely nothing concerning going on this regards.

## 2023-09-19 NOTE — PATIENT INSTRUCTIONS
You will need to get Debrox drops over-the-counter, or you can use half-strength peroxide and warm water.    2.  You need to lay on your side, fill 1 ear, wait about 5 minutes, then put cotton ball in, and repeat the process on the other side.    3.  Need to do this at least twice if not 3 times a day, for about a week.    4.  Stop in next week for [facility] to irrigate them, you do not need to be seen by me at that time.

## 2023-09-19 NOTE — ASSESSMENT & PLAN NOTE
This is as of his 9/23 office visit.  Action is deep, looks dry presently.  Discussed with patient he needs to utilize Debrox and/or peroxide/water, come back in next week in regards to getting them irrigated.

## 2023-09-19 NOTE — ASSESSMENT & PLAN NOTE
We reviewed his BMD at his 9/23 office visit.  We do not have any comparison, he was told he had osteopenia in the past.  He is on vitamin D, and his level is fine.  He tries to get some calcium in his diet, he will look at increasing that, and we discussed taking a couple of Tums at night.  We will repeat BMD in 2 years and make further recommendation at that time.

## 2023-09-19 NOTE — ASSESSMENT & PLAN NOTE
L of 69 is at goal as of his 9/23 office visit.  He is stable to continue with moderate dose Crestor.

## 2023-09-19 NOTE — ASSESSMENT & PLAN NOTE
Vitamin D is fine as well at 54 as of his 9/23 office visit.  Patient is stable to continue moderate dose over-the-counter supplementation.

## 2023-09-19 NOTE — ASSESSMENT & PLAN NOTE
B12 is fine at 780 as of his 9/23 office visit.  Patient stable to continue low-dose over the mentation.

## 2023-09-19 NOTE — ASSESSMENT & PLAN NOTE
Patient saw urology in follow-up after the ER visit for urinary retention.  Valerio was removed, he had very minimal residual initially, he was seen in follow-up another week later, and had no residual.  Has some issues with hesitancy at night, but otherwise improved in general.  Of note we did obtain a PSA on him that was just very mildly elevated at 4.9, for his age this is certainly very reasonable.  We will repeat PSA and get free and total on return to office, discussed with patient further at that time.

## 2023-09-19 NOTE — ASSESSMENT & PLAN NOTE
TSH is 1.3 as of his 9/23 office visit.  Patient is stable to continue 100 mcg daily, has been on the same dose for quite some time.

## 2023-09-28 ENCOUNTER — CLINICAL SUPPORT (OUTPATIENT)
Dept: INTERNAL MEDICINE | Facility: CLINIC | Age: 83
End: 2023-09-28
Payer: MEDICARE

## 2023-09-28 DIAGNOSIS — H61.23 BILATERAL IMPACTED CERUMEN: Primary | ICD-10-CM

## 2023-09-28 PROCEDURE — 69210 REMOVE IMPACTED EAR WAX UNI: CPT | Performed by: INTERNAL MEDICINE

## 2024-02-17 DIAGNOSIS — R33.9 URINARY RETENTION: ICD-10-CM

## 2024-02-17 DIAGNOSIS — R33.8 BENIGN PROSTATIC HYPERPLASIA WITH URINARY RETENTION: ICD-10-CM

## 2024-02-17 DIAGNOSIS — N40.1 BENIGN PROSTATIC HYPERPLASIA WITH URINARY RETENTION: ICD-10-CM

## 2024-02-19 RX ORDER — TERAZOSIN 1 MG/1
1 CAPSULE ORAL
Qty: 90 CAPSULE | Refills: 3 | Status: SHIPPED | OUTPATIENT
Start: 2024-02-19

## 2024-03-18 RX ORDER — ROSUVASTATIN CALCIUM 20 MG/1
20 TABLET, COATED ORAL DAILY
Qty: 90 TABLET | Refills: 3 | Status: SHIPPED | OUTPATIENT
Start: 2024-03-18

## 2024-08-29 RX ORDER — LEVOTHYROXINE SODIUM 100 UG/1
100 TABLET ORAL DAILY
Qty: 90 TABLET | Refills: 3 | OUTPATIENT
Start: 2024-08-29

## 2024-12-07 DIAGNOSIS — R33.8 BENIGN PROSTATIC HYPERPLASIA WITH URINARY RETENTION: ICD-10-CM

## 2024-12-07 DIAGNOSIS — N40.1 BENIGN PROSTATIC HYPERPLASIA WITH URINARY RETENTION: ICD-10-CM

## 2024-12-07 DIAGNOSIS — R33.9 URINARY RETENTION: ICD-10-CM

## 2024-12-09 RX ORDER — TERAZOSIN 1 MG/1
1 CAPSULE ORAL
Qty: 90 CAPSULE | Refills: 3 | OUTPATIENT
Start: 2024-12-09

## 2025-02-17 ENCOUNTER — OFFICE VISIT (OUTPATIENT)
Age: 85
End: 2025-02-17
Payer: MEDICARE

## 2025-02-17 VITALS
TEMPERATURE: 97.9 F | HEART RATE: 68 BPM | BODY MASS INDEX: 21.84 KG/M2 | WEIGHT: 156 LBS | DIASTOLIC BLOOD PRESSURE: 68 MMHG | HEIGHT: 71 IN | SYSTOLIC BLOOD PRESSURE: 126 MMHG | OXYGEN SATURATION: 99 %

## 2025-02-17 DIAGNOSIS — Z00.00 ENCOUNTER FOR SUBSEQUENT ANNUAL WELLNESS VISIT IN MEDICARE PATIENT: ICD-10-CM

## 2025-02-17 DIAGNOSIS — E78.2 MIXED HYPERLIPIDEMIA: ICD-10-CM

## 2025-02-17 DIAGNOSIS — G45.9 TIA DUE TO EMBOLISM: ICD-10-CM

## 2025-02-17 DIAGNOSIS — I74.9 TIA DUE TO EMBOLISM: ICD-10-CM

## 2025-02-17 DIAGNOSIS — K21.9 GASTROESOPHAGEAL REFLUX DISEASE WITHOUT ESOPHAGITIS: ICD-10-CM

## 2025-02-17 DIAGNOSIS — N40.1 BENIGN PROSTATIC HYPERPLASIA WITH URINARY RETENTION: ICD-10-CM

## 2025-02-17 DIAGNOSIS — E55.9 VITAMIN D DEFICIENCY: ICD-10-CM

## 2025-02-17 DIAGNOSIS — Z12.5 PROSTATE CANCER SCREENING: ICD-10-CM

## 2025-02-17 DIAGNOSIS — R33.8 BENIGN PROSTATIC HYPERPLASIA WITH URINARY RETENTION: ICD-10-CM

## 2025-02-17 DIAGNOSIS — E89.0 POSTABLATIVE HYPOTHYROIDISM: ICD-10-CM

## 2025-02-17 DIAGNOSIS — R33.9 URINARY RETENTION: Primary | ICD-10-CM

## 2025-02-17 DIAGNOSIS — R73.01 IMPAIRED FASTING GLUCOSE: ICD-10-CM

## 2025-02-17 PROBLEM — H61.23 BILATERAL IMPACTED CERUMEN: Status: RESOLVED | Noted: 2023-09-19 | Resolved: 2025-02-17

## 2025-02-17 PROCEDURE — 1126F AMNT PAIN NOTED NONE PRSNT: CPT | Performed by: INTERNAL MEDICINE

## 2025-02-17 PROCEDURE — 99214 OFFICE O/P EST MOD 30 MIN: CPT | Performed by: INTERNAL MEDICINE

## 2025-02-17 PROCEDURE — G0439 PPPS, SUBSEQ VISIT: HCPCS | Performed by: INTERNAL MEDICINE

## 2025-02-17 PROCEDURE — 1160F RVW MEDS BY RX/DR IN RCRD: CPT | Performed by: INTERNAL MEDICINE

## 2025-02-17 PROCEDURE — G2211 COMPLEX E/M VISIT ADD ON: HCPCS | Performed by: INTERNAL MEDICINE

## 2025-02-17 PROCEDURE — 1159F MED LIST DOCD IN RCRD: CPT | Performed by: INTERNAL MEDICINE

## 2025-02-17 PROCEDURE — 1170F FXNL STATUS ASSESSED: CPT | Performed by: INTERNAL MEDICINE

## 2025-02-17 RX ORDER — TERAZOSIN 1 MG/1
1 CAPSULE ORAL
Qty: 90 CAPSULE | Refills: 1 | Status: SHIPPED | OUTPATIENT
Start: 2025-02-17

## 2025-02-17 RX ORDER — LEVOTHYROXINE SODIUM 100 UG/1
100 TABLET ORAL DAILY
Qty: 90 TABLET | Refills: 1 | Status: SHIPPED | OUTPATIENT
Start: 2025-02-17

## 2025-02-17 RX ORDER — CLOPIDOGREL BISULFATE 75 MG/1
75 TABLET ORAL DAILY
Qty: 90 TABLET | Refills: 1 | Status: SHIPPED | OUTPATIENT
Start: 2025-02-17

## 2025-02-17 RX ORDER — PANTOPRAZOLE SODIUM 40 MG/1
40 TABLET, DELAYED RELEASE ORAL DAILY
Qty: 90 TABLET | Refills: 1 | Status: SHIPPED | OUTPATIENT
Start: 2025-02-17

## 2025-02-17 RX ORDER — ROSUVASTATIN CALCIUM 20 MG/1
20 TABLET, COATED ORAL DAILY
Qty: 90 TABLET | Refills: 1 | Status: SHIPPED | OUTPATIENT
Start: 2025-02-17

## 2025-02-17 NOTE — ASSESSMENT & PLAN NOTE
Patient no new issues this regards as of 2/25, he was started on low-dose Hytrin at that time and remains on same.

## 2025-02-17 NOTE — ASSESSMENT & PLAN NOTE
LDL was well normal when we saw him in 9/23, he has been maintained on moderate dose Crestor, will get labs here with that a month or so and make further recommendations otherwise continue current dosing.

## 2025-02-17 NOTE — ASSESSMENT & PLAN NOTE
Patient no dysphagia no significant dyspepsia on chronic PPI as of 2/25.  He is stable to continue with moderate dose pantoprazole.

## 2025-02-17 NOTE — ASSESSMENT & PLAN NOTE
Patient no new focal motor deficits as of 2/25 OV, he remains on Plavix and statin, no GI side effects, certainly appropriate to continue same.

## 2025-02-17 NOTE — ASSESSMENT & PLAN NOTE
YAYAV completed 2/25.  Patient remains active and independent.  No falls and no hospitalizations past year.  Patient has a living will, he will try to bring this by the office sometime.

## 2025-02-17 NOTE — ASSESSMENT & PLAN NOTE
Patient still on 100 mcg of levothyroxine, will check labs next month and make further recommendations, otherwise continue same.

## 2025-02-17 NOTE — PROGRESS NOTES
Chief Complaint  Hypothyroidism, Follow-up (Pt didn't have labs, he had to see a provider due to insurance issues, he is now back. He is needing refills. ), and Medicare Wellness-subsequent    Subjective      Shai Cole presents to [facility]      six month check-up  Pertinent negative symptoms include no abdominal pain, no anorexia, no joint pain, no change in stool, no chest pain, no chills, no congestion, no cough, no diaphoresis, no fatigue, no fever, no headaches, no joint swelling, no myalgias, no nausea, no neck pain, no numbness, no rash, no sore throat, no swollen glands, no dysuria, no vertigo, no visual change, no vomiting and no weakness.   Treatment and/or Medications comments include: none     Patient very pleasant 83-year-old male with underlying hyperlipidemia, hypothyroidism, prior TIA, currently being evaluated for BPH by urology,  seen 6/23 as New Patient, and who is coming back now 9/23 for initial 3-month follow-up.  We will go over his med list, review recent labs, and make further recommendations at that time.    Review of Systems   Constitutional:  Negative for appetite change, chills, diaphoresis, fatigue and fever.   HENT:  Negative for congestion, ear pain, sore throat and swollen glands.    Eyes:  Negative for blurred vision.   Respiratory:  Negative for cough, chest tightness, shortness of breath and wheezing.    Cardiovascular:  Negative for chest pain, palpitations and leg swelling.   Gastrointestinal:  Negative for abdominal pain, anorexia, nausea and vomiting.   Genitourinary:  Negative for difficulty urinating, dysuria and hematuria.   Musculoskeletal:  Negative for arthralgias, gait problem, joint pain, myalgias and neck pain.   Skin:  Negative for rash and skin lesions.   Neurological:  Negative for vertigo, syncope, weakness, numbness, memory problem and confusion.   Psychiatric/Behavioral:  Negative for self-injury and depressed mood.   "      Objective   Vital Signs:   /68   Pulse 68   Temp 97.9 °F (36.6 °C) (Oral)   Ht 180.3 cm (70.98\")   Wt 70.8 kg (156 lb)   SpO2 99%   BMI 21.77 kg/m²           Physical Exam  Vitals and nursing note reviewed.   Constitutional:       General: He is not in acute distress.     Appearance: Normal appearance. He is not toxic-appearing.   HENT:      Head: Atraumatic.      Right Ear: External ear normal.      Left Ear: External ear normal.      Nose: Nose normal.      Mouth/Throat:      Mouth: Mucous membranes are moist.   Eyes:      General:         Right eye: No discharge.         Left eye: No discharge.      Extraocular Movements: Extraocular movements intact.      Pupils: Pupils are equal, round, and reactive to light.   Neck:      Comments: No carotid bruit.  Cardiovascular:      Rate and Rhythm: Normal rate and regular rhythm.      Pulses: Normal pulses.      Heart sounds: Normal heart sounds. No murmur heard.     No gallop.      Comments: Heart tones normal, no ectopy, no S3.  Pulmonary:      Effort: Pulmonary effort is normal. No respiratory distress.      Breath sounds: No wheezing, rhonchi or rales.      Comments: Lung fields clear bilaterally.  Abdominal:      General: There is no distension.      Palpations: Abdomen is soft. There is no mass.      Tenderness: There is no abdominal tenderness. There is no guarding.   Musculoskeletal:         General: No swelling or tenderness.      Cervical back: No tenderness.      Right lower leg: No edema.      Left lower leg: No edema.      Comments: No edema.   Skin:     General: Skin is warm and dry.      Findings: No rash.   Neurological:      General: No focal deficit present.      Mental Status: He is alert and oriented to person, place, and time. Mental status is at baseline.      Motor: No weakness.      Gait: Gait normal.   Psychiatric:         Mood and Affect: Mood normal.         Thought Content: Thought content normal.          Result Review   The " following data was reviewed by: Juan Pablo Mar MD on 06/19/2023:  [] Laboratory  [] Microbiology  [] Radiology  [] EKG/telemetry  [] Cardiology/Vascular  [] Pathology  [] Old records             Assessment and Plan   Diagnoses and all orders for this visit:    1. Urinary retention (Primary)  -     terazosin (HYTRIN) 1 MG capsule; Take 1 capsule by mouth every night at bedtime.  Dispense: 90 capsule; Refill: 1    2. Encounter for subsequent annual wellness visit in Medicare patient  Assessment & Plan:  AWV completed 2/25.  Patient remains active and independent.  No falls and no hospitalizations past year.  Patient has a living will, he will try to bring this by the office sometime.             Orders:  -     CBC w AUTO Differential; Future  -     Vitamin B12 anemia; Future  -     Folate anemia; Future    3. Mixed hyperlipidemia  Assessment & Plan:  LDL was well normal when we saw him in 9/23, he has been maintained on moderate dose Crestor, will get labs here with that a month or so and make further recommendations otherwise continue current dosing.    Orders:  -     Comprehensive metabolic panel; Future  -     Lipid panel; Future    4. Postablative hypothyroidism  Overview:  Patient was ablated around age 64 due to episode of hyperthyroidism.    Assessment & Plan:  Patient still on 100 mcg of levothyroxine, will check labs next month and make further recommendations, otherwise continue same.    Orders:  -     TSH; Future    5. Impaired fasting glucose  Assessment & Plan:  Patient's A1c was 5.6 when we saw him in 9/23, he reports A1c has not elevated significantly, will see what it is next month and make further recommendation.    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    Assessment & Plan:  Patient no new focal motor deficits as of 2/25 OV, he remains on Plavix and statin, no  GI side effects, certainly appropriate to continue same.      7. Benign prostatic hyperplasia with urinary retention  Assessment & Plan:  Patient no new issues this regards as of 2/25, he was started on low-dose Hytrin at that time and remains on same.      8. Gastroesophageal reflux disease without esophagitis  Assessment & Plan:  Patient no dysphagia no significant dyspepsia on chronic PPI as of 2/25.  He is stable to continue with moderate dose pantoprazole.      9. Vitamin D deficiency  -     Vitamin D 25 hydroxy; Future    10. Prostate cancer screening  -     PSA SCREENING; Future    Other orders  -     clopidogrel (PLAVIX) 75 MG tablet; Take 1 tablet by mouth Daily.  Dispense: 90 tablet; Refill: 1  -     pantoprazole (PROTONIX) 40 MG EC tablet; Take 1 tablet by mouth Daily.  Dispense: 90 tablet; Refill: 1  -     rosuvastatin (CRESTOR) 20 MG tablet; Take 1 tablet by mouth Daily.  Dispense: 90 tablet; Refill: 1  -     levothyroxine (SYNTHROID, LEVOTHROID) 100 MCG tablet; Take 1 tablet by mouth Daily.  Dispense: 90 tablet; Refill: 1        BMI is within normal parameters. No other follow-up for BMI required.            Follow Up   Return in about 7 months (around 9/17/2025).  Patient was given instructions and counseling regarding his condition or for health maintenance advice. Please see specific information pulled into the AVS if appropriate.     Total Time Spent:   minutes     This time includes time spent by me in the following activities: preparing for the visit, reviewing extensive past medical history and tests, performing a medically appropriate examination and/or evaluation, counseling and educating the patient and/or caregivers, ordering medications, tests, or procedures, referring and/or communicating with other health care professionals and documenting information in the medical record all on this date of service.

## 2025-02-17 NOTE — ASSESSMENT & PLAN NOTE
Patient's A1c was 5.6 when we saw him in 9/23, he reports A1c has not elevated significantly, will see what it is next month and make further recommendation.

## 2025-02-17 NOTE — PROGRESS NOTES
Subjective   The ABCs of the Annual Wellness Visit  Medicare Wellness Visit      Shai Cole is a 84 y.o. patient who presents for a Medicare Wellness Visit.    The following portions of the patient's history were reviewed and   updated as appropriate: allergies, current medications, past family history, past medical history, past social history, past surgical history, and problem list.    Compared to one year ago, the patient's physical   health is the same.  Compared to one year ago, the patient's mental   health is the same.    Recent Hospitalizations:  He was not admitted to the hospital during the last year.     Current Medical Providers:  Patient Care Team:  Juan Pablo Mar MD as PCP - General (Internal Medicine)  Fide Tucker APRN as Nurse Practitioner (Urology)    Outpatient Medications Prior to Visit   Medication Sig Dispense Refill    Cholecalciferol (vitamin D3) 125 MCG (5000 UT) tablet tablet Take 1 tablet by mouth 3 (Three) Times a Week.      clopidogrel (PLAVIX) 75 MG tablet Take  by mouth Daily.      levothyroxine (SYNTHROID, LEVOTHROID) 100 MCG tablet Take 1 tablet by mouth Daily. 90 tablet 1    Omega-3 Fatty Acids (fish oil) 1000 MG capsule capsule Take  by mouth Daily With Breakfast.      pantoprazole (PROTONIX) 40 MG EC tablet Take 1 tablet by mouth Daily.      rosuvastatin (CRESTOR) 20 MG tablet TAKE 1 TABLET EVERY DAY 90 tablet 3    terazosin (HYTRIN) 1 MG capsule TAKE 1 CAPSULE AT BEDTIME 90 capsule 3    vitamin B-12 (CYANOCOBALAMIN) 1000 MCG tablet Take 1 tablet by mouth 1 (One) Time Per Week.       No facility-administered medications prior to visit.     No opioid medication identified on active medication list. I have reviewed chart for other potential  high risk medication/s and harmful drug interactions in the elderly.      Aspirin is not on active medication list.  Aspirin use is not indicated based on review of current medical condition/s. Risk of harm outweighs potential  "benefits.  .    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  ACP discussion was held with the patient during this visit. Patient has an advance directive (not in EMR), copy requested.            Objective   Vitals:    02/17/25 0912   BP: 126/68   Pulse: 68   Temp: 97.9 °F (36.6 °C)   TempSrc: Oral   SpO2: 99%   Weight: 70.8 kg (156 lb)   Height: 180.3 cm (70.98\")   PainSc: 0-No pain       Estimated body mass index is 21.77 kg/m² as calculated from the following:    Height as of this encounter: 180.3 cm (70.98\").    Weight as of this encounter: 70.8 kg (156 lb).    BMI is within normal parameters. No other follow-up for BMI required.           Does the patient have evidence of cognitive impairment? No                                                                                               Health  Risk Assessment    Smoking Status:  Social History     Tobacco Use   Smoking Status Never    Passive exposure: Never   Smokeless Tobacco Never     Alcohol Consumption:  Social History     Substance and Sexual Activity   Alcohol Use Yes    Alcohol/week: 12.0 standard drinks of alcohol    Types: 2 Glasses of wine, 10 Drinks containing 0.5 oz of alcohol per week    Comment: daily, but not to excess       Fall Risk Screen  LOIDAADI Fall Risk Assessment was completed, and patient is at LOW risk for falls.Assessment completed " on:2025    Depression Screening   Little interest or pleasure in doing things? Not at all   Feeling down, depressed, or hopeless? Not at all   PHQ-2 Total Score 0      Health Habits and Functional and Cognitive Screenin/17/2025     9:11 AM   Functional & Cognitive Status   Do you have difficulty preparing food and eating? No   Do you have difficulty bathing yourself, getting dressed or grooming yourself? No   Do you have difficulty using the toilet? No   Do you have difficulty moving around from place to place? No   Do you have trouble with steps or getting out of a bed or a chair? No   Current Diet Well Balanced Diet   Dental Exam Up to date   Eye Exam Up to date   Exercise (times per week) 0 times per week   Current Exercises Include No Regular Exercise   Do you need help using the phone?  No   Are you deaf or do you have serious difficulty hearing?  No   Do you need help to go to places out of walking distance? No   Do you need help shopping? No   Do you need help preparing meals?  No   Do you need help with housework?  No   Do you need help with laundry? No   Do you need help taking your medications? No   Do you need help managing money? No   Do you ever drive or ride in a car without wearing a seat belt? No   Have you felt unusual stress, anger or loneliness in the last month? No   Who do you live with? Spouse   If you need help, do you have trouble finding someone available to you? No   Have you been bothered in the last four weeks by sexual problems? No   Do you have difficulty concentrating, remembering or making decisions? No           Age-appropriate Screening Schedule:  Refer to the list below for future screening recommendations based on patient's age, sex and/or medical conditions. Orders for these recommended tests are listed in the plan section. The patient has been provided with a written plan.    Health Maintenance List  Health Maintenance   Topic Date Due    TDAP/TD VACCINES (1 -  Tdap) Never done    ZOSTER VACCINE (1 of 2) Never done    RSV Vaccine - Adults (1 - 1-dose 75+ series) Never done    ANNUAL WELLNESS VISIT  12/12/2023    LIPID PANEL  09/14/2024    INFLUENZA VACCINE  Completed    Pneumococcal Vaccine 50+  Completed    COVID-19 Vaccine  Discontinued                                                                                                                                                CMS Preventative Services Quick Reference  Risk Factors Identified During Encounter  None Identified    The above risks/problems have been discussed with the patient.  Pertinent information has been shared with the patient in the After Visit Summary.  An After Visit Summary and PPPS were made available to the patient.    Follow Up:   Next Medicare Wellness visit to be scheduled in 1 year.     Assessment & Plan  Urinary retention         Encounter for subsequent annual wellness visit in Medicare patient  AWV completed 2/25.  Patient remains active and independent.  No falls and no hospitalizations past year.  Patient has a living will, he will try to bring this by the office sometime.                Follow Up:   No follow-ups on file.

## 2025-03-11 ENCOUNTER — LAB (OUTPATIENT)
Dept: LAB | Facility: HOSPITAL | Age: 85
End: 2025-03-11
Payer: MEDICARE

## 2025-03-11 DIAGNOSIS — Z12.5 PROSTATE CANCER SCREENING: ICD-10-CM

## 2025-03-11 DIAGNOSIS — Z00.00 ENCOUNTER FOR SUBSEQUENT ANNUAL WELLNESS VISIT IN MEDICARE PATIENT: ICD-10-CM

## 2025-03-11 DIAGNOSIS — E78.2 MIXED HYPERLIPIDEMIA: ICD-10-CM

## 2025-03-11 DIAGNOSIS — E55.9 VITAMIN D DEFICIENCY: ICD-10-CM

## 2025-03-11 DIAGNOSIS — E89.0 POSTABLATIVE HYPOTHYROIDISM: ICD-10-CM

## 2025-03-11 DIAGNOSIS — R73.01 IMPAIRED FASTING GLUCOSE: ICD-10-CM

## 2025-03-11 LAB
25(OH)D3 SERPL-MCNC: 57.2 NG/ML (ref 30–100)
ALBUMIN SERPL-MCNC: 4.5 G/DL (ref 3.5–5.2)
ALBUMIN/GLOB SERPL: 1.8 G/DL
ALP SERPL-CCNC: 51 U/L (ref 39–117)
ALT SERPL W P-5'-P-CCNC: 30 U/L (ref 1–41)
ANION GAP SERPL CALCULATED.3IONS-SCNC: 12 MMOL/L (ref 5–15)
AST SERPL-CCNC: 31 U/L (ref 1–40)
BASOPHILS # BLD AUTO: 0.03 10*3/MM3 (ref 0–0.2)
BASOPHILS NFR BLD AUTO: 0.7 % (ref 0–1.5)
BILIRUB SERPL-MCNC: 1.1 MG/DL (ref 0–1.2)
BUN SERPL-MCNC: 19 MG/DL (ref 8–23)
BUN/CREAT SERPL: 15.3 (ref 7–25)
CALCIUM SPEC-SCNC: 9.7 MG/DL (ref 8.6–10.5)
CHLORIDE SERPL-SCNC: 100 MMOL/L (ref 98–107)
CHOLEST SERPL-MCNC: 181 MG/DL (ref 0–200)
CO2 SERPL-SCNC: 28 MMOL/L (ref 22–29)
CREAT SERPL-MCNC: 1.24 MG/DL (ref 0.76–1.27)
DEPRECATED RDW RBC AUTO: 39.4 FL (ref 37–54)
EGFRCR SERPLBLD CKD-EPI 2021: 57 ML/MIN/1.73
EOSINOPHIL # BLD AUTO: 0.13 10*3/MM3 (ref 0–0.4)
EOSINOPHIL NFR BLD AUTO: 3 % (ref 0.3–6.2)
ERYTHROCYTE [DISTWIDTH] IN BLOOD BY AUTOMATED COUNT: 11.6 % (ref 12.3–15.4)
FOLATE SERPL-MCNC: 17.6 NG/ML (ref 4.78–24.2)
GLOBULIN UR ELPH-MCNC: 2.5 GM/DL
GLUCOSE SERPL-MCNC: 111 MG/DL (ref 65–99)
HBA1C MFR BLD: 5.3 % (ref 4.8–5.6)
HCT VFR BLD AUTO: 47.2 % (ref 37.5–51)
HDLC SERPL-MCNC: 93 MG/DL (ref 40–60)
HGB BLD-MCNC: 15.5 G/DL (ref 13–17.7)
IMM GRANULOCYTES # BLD AUTO: 0.01 10*3/MM3 (ref 0–0.05)
IMM GRANULOCYTES NFR BLD AUTO: 0.2 % (ref 0–0.5)
LDLC SERPL CALC-MCNC: 75 MG/DL (ref 0–100)
LDLC/HDLC SERPL: 0.8 {RATIO}
LYMPHOCYTES # BLD AUTO: 1.86 10*3/MM3 (ref 0.7–3.1)
LYMPHOCYTES NFR BLD AUTO: 43.5 % (ref 19.6–45.3)
MCH RBC QN AUTO: 30.9 PG (ref 26.6–33)
MCHC RBC AUTO-ENTMCNC: 32.8 G/DL (ref 31.5–35.7)
MCV RBC AUTO: 94 FL (ref 79–97)
MONOCYTES # BLD AUTO: 0.31 10*3/MM3 (ref 0.1–0.9)
MONOCYTES NFR BLD AUTO: 7.2 % (ref 5–12)
NEUTROPHILS NFR BLD AUTO: 1.94 10*3/MM3 (ref 1.7–7)
NEUTROPHILS NFR BLD AUTO: 45.4 % (ref 42.7–76)
NRBC BLD AUTO-RTO: 0 /100 WBC (ref 0–0.2)
PLATELET # BLD AUTO: 207 10*3/MM3 (ref 140–450)
PMV BLD AUTO: 9.5 FL (ref 6–12)
POTASSIUM SERPL-SCNC: 4.3 MMOL/L (ref 3.5–5.2)
PROT SERPL-MCNC: 7 G/DL (ref 6–8.5)
PSA SERPL-MCNC: 5.95 NG/ML (ref 0–4)
RBC # BLD AUTO: 5.02 10*6/MM3 (ref 4.14–5.8)
SODIUM SERPL-SCNC: 140 MMOL/L (ref 136–145)
TRIGL SERPL-MCNC: 68 MG/DL (ref 0–150)
TSH SERPL DL<=0.05 MIU/L-ACNC: 2.33 UIU/ML (ref 0.27–4.2)
VIT B12 BLD-MCNC: 635 PG/ML (ref 211–946)
VLDLC SERPL-MCNC: 13 MG/DL (ref 5–40)
WBC NRBC COR # BLD AUTO: 4.28 10*3/MM3 (ref 3.4–10.8)

## 2025-03-11 PROCEDURE — 82607 VITAMIN B-12: CPT

## 2025-03-11 PROCEDURE — 82306 VITAMIN D 25 HYDROXY: CPT

## 2025-03-11 PROCEDURE — 80061 LIPID PANEL: CPT

## 2025-03-11 PROCEDURE — 80053 COMPREHEN METABOLIC PANEL: CPT

## 2025-03-11 PROCEDURE — G0103 PSA SCREENING: HCPCS

## 2025-03-11 PROCEDURE — 84443 ASSAY THYROID STIM HORMONE: CPT

## 2025-03-11 PROCEDURE — 36415 COLL VENOUS BLD VENIPUNCTURE: CPT

## 2025-03-11 PROCEDURE — 83036 HEMOGLOBIN GLYCOSYLATED A1C: CPT

## 2025-03-11 PROCEDURE — 85025 COMPLETE CBC W/AUTO DIFF WBC: CPT

## 2025-03-11 PROCEDURE — 82746 ASSAY OF FOLIC ACID SERUM: CPT

## 2025-08-21 ENCOUNTER — EXTERNAL PBMM DATA (OUTPATIENT)
Dept: PHARMACY | Facility: OTHER | Age: 85
End: 2025-08-21
Payer: MEDICARE

## 2025-08-28 ENCOUNTER — PATIENT MESSAGE (OUTPATIENT)
Age: 85
End: 2025-08-28
Payer: MEDICARE

## 2025-08-28 DIAGNOSIS — E78.2 MIXED HYPERLIPIDEMIA: Primary | ICD-10-CM

## 2025-08-28 DIAGNOSIS — R73.01 IMPAIRED FASTING GLUCOSE: ICD-10-CM
